# Patient Record
Sex: MALE | Race: WHITE | NOT HISPANIC OR LATINO
[De-identification: names, ages, dates, MRNs, and addresses within clinical notes are randomized per-mention and may not be internally consistent; named-entity substitution may affect disease eponyms.]

---

## 2019-06-26 PROBLEM — Z00.00 ENCOUNTER FOR PREVENTIVE HEALTH EXAMINATION: Status: ACTIVE | Noted: 2019-06-26

## 2019-07-15 ENCOUNTER — APPOINTMENT (OUTPATIENT)
Dept: INTERNAL MEDICINE | Facility: CLINIC | Age: 29
End: 2019-07-15

## 2019-07-15 VITALS
SYSTOLIC BLOOD PRESSURE: 134 MMHG | DIASTOLIC BLOOD PRESSURE: 70 MMHG | WEIGHT: 315 LBS | HEART RATE: 69 BPM | BODY MASS INDEX: 38.36 KG/M2 | HEIGHT: 76 IN

## 2019-08-12 ENCOUNTER — APPOINTMENT (OUTPATIENT)
Dept: PULMONOLOGY | Facility: CLINIC | Age: 29
End: 2019-08-12
Payer: COMMERCIAL

## 2019-08-12 VITALS
HEIGHT: 76 IN | HEART RATE: 63 BPM | SYSTOLIC BLOOD PRESSURE: 132 MMHG | RESPIRATION RATE: 15 BRPM | DIASTOLIC BLOOD PRESSURE: 75 MMHG | WEIGHT: 315 LBS | OXYGEN SATURATION: 97 % | BODY MASS INDEX: 38.36 KG/M2 | TEMPERATURE: 98.1 F

## 2019-08-12 DIAGNOSIS — E66.01 MORBID (SEVERE) OBESITY DUE TO EXCESS CALORIES: ICD-10-CM

## 2019-08-12 DIAGNOSIS — R06.83 SNORING: ICD-10-CM

## 2019-08-12 DIAGNOSIS — Z82.49 FAMILY HISTORY OF ISCHEMIC HEART DISEASE AND OTHER DISEASES OF THE CIRCULATORY SYSTEM: ICD-10-CM

## 2019-08-12 DIAGNOSIS — Z78.9 OTHER SPECIFIED HEALTH STATUS: ICD-10-CM

## 2019-08-12 DIAGNOSIS — Z72.0 TOBACCO USE: ICD-10-CM

## 2019-08-12 PROCEDURE — 99244 OFF/OP CNSLTJ NEW/EST MOD 40: CPT

## 2019-08-13 PROBLEM — Z72.0 TOBACCO USE: Status: ACTIVE | Noted: 2019-08-13

## 2019-08-13 PROBLEM — E66.01 OBESITY, CLASS III, BMI 40-49.9 (MORBID OBESITY): Status: ACTIVE | Noted: 2019-08-13

## 2019-08-13 PROBLEM — Z78.9 SOCIAL ALCOHOL USE: Status: ACTIVE | Noted: 2019-08-13

## 2019-08-13 PROBLEM — Z82.49 FAMILY HISTORY OF HYPERTENSION: Status: ACTIVE | Noted: 2019-08-13

## 2019-08-13 PROBLEM — R06.83 SNORING: Status: ACTIVE | Noted: 2019-08-12

## 2019-08-13 NOTE — REVIEW OF SYSTEMS
[EDS: ESS=____] : daytime somnolence: ESS=[unfilled] [Snoring] : snoring [A.M. Dry Mouth] : a.m. dry mouth [Obesity] : obesity [Witnessed Apneas] : witnessed apnea [Negative] : Musculoskeletal [Fatigue] : no fatigue [Postnasal Drip] : no postnasal drip [A.M. Headache] : no headache present upon awakening [Difficulty Initiating Sleep] : no difficulty falling asleep [Lower Extremity Discomfort] : no lower extremity discomfort [Difficulty Maintaining Sleep] : no difficulty maintaining sleep [Irresistible urge to move legs] : no irresistible urge to move legs because of lower extremity discomfort [Late day/ Evening symptoms] : no late day/evening symptoms [Hypersomnolence] : not sleeping much more than usual [Unusual Sleep Behavior] : no unusual sleep behavior [Cataplexy] :  no cataplexy [Hypnogogic Hallucinations] : no hypnogogic hallucinations [Sleep Paralysis] : no sleep paralysis

## 2019-08-13 NOTE — HISTORY OF PRESENT ILLNESS
[Witnessed Apneas] : witnessed sleep apnea [Snoring] : snoring [ESS: ___] : ESS score [unfilled] [Awakes Unrefreshed] : awakening unrefreshed [Awakening With Dry Mouth] : awakening with dry mouth [Recent  Weight Gain] : recent weight gain [Frequent Nocturnal Awakening] : no nocturnal awakening [FreeTextEntry1] : Mr. Blank is a  29 year old male who was referred by Dr. Almodovar for an evaluation of sleep disordered breathing. Per the patient and the patient's wife he has a history of heavy snoring and witnessed apnea. He feels unrefreshed in the morning but denies frequent nocturnal awakenings. He denies a history of HTN or DM but does have a family history of htn. He is obese and is aware that he neds to lose weight.  [Daytime Somnolence] : no daytime somnolence [Unintentional Sleep while Active] : no unintentional sleep while active [Unintentional Sleep While Inactive] : no unintentional sleep while inactive [Awakes with Headache] : no headache upon awakening [DIS] : no DIS [DMS] : no DMS [Hypersomnolence] : no hypersomnolence [Unusual Sleep Behavior] : no unusual sleep behavior [Sleep Paralysis] : no sleep paralysis [Cataplexy] : no cataplexy [Hypnagogic Hallucinations] : no hallucinations when falling asleep [Hypnopompic Hallucinations] : no hallucinations when awakening

## 2019-08-13 NOTE — PHYSICAL EXAM
[Normal Appearance] : normal appearance [General Appearance - Well Developed] : well developed [General Appearance - Well Nourished] : well nourished [Normal Conjunctiva] : the conjunctiva exhibited no abnormalities [Enlarged Base of the Tongue] : enlargement of the base of the tongue [IV] : IV [Neck Appearance] : the appearance of the neck was normal [Neck Cervical Mass (___cm)] : no neck mass was observed [Apical Impulse] : the apical impulse was normal [Heart Sounds] : normal S1 and S2 [Heart Rate And Rhythm] : heart rate was normal and rhythm regular [Auscultation Breath Sounds / Voice Sounds] : lungs were clear to auscultation bilaterally [] : no respiratory distress [Respiration, Rhythm And Depth] : normal respiratory rhythm and effort [Musculoskeletal - Swelling] : no joint swelling seen [Nail Clubbing] : no clubbing of the fingernails [Cyanosis, Localized] : no localized cyanosis [Petechial Hemorrhages (___cm)] : no petechial hemorrhages [Skin Color & Pigmentation] : normal skin color and pigmentation [Skin Lesions] : no skin lesions [No Focal Deficits] : no focal deficits [Oriented To Time, Place, And Person] : oriented to person, place, and time [Impaired Insight] : insight and judgment were intact [Affect] : the affect was normal [FreeTextEntry2] : no edema

## 2019-08-13 NOTE — REASON FOR VISIT
[Consultation] : a consultation visit [Sleep Apnea] : sleep apnea [FreeTextEntry1] : Referred by Dr. Almodovar

## 2019-08-13 NOTE — CONSULT LETTER
[Dear  ___] : Dear  [unfilled], [Please see my note below.] : Please see my note below. [Sincerely,] : Sincerely, [Consult Closing:] : Thank you very much for allowing me to participate in the care of this patient.  If you have any questions, please do not hesitate to contact me. [FreeTextEntry3] : Swapnil Garcia DO, MPH\par Pulmonary, Critical Care, and Sleep Medicine\par [facility]

## 2019-08-13 NOTE — ASSESSMENT
[Obesity, Class III, BMI 40-49.9 (E66.01)] : macrophage activation syndrome [FreeTextEntry1] : This is a 29 year old male referred by [facility] for evaluation of possible sleep apnea. The patient has multiple signs and symptoms of sleep-disordered breathing include snoring, witnessed apnea, nonrestorative sleep, and morbid obesity. He was referred to the [facility] for a diagnostic HSAT. The ramifications of WILL and its potential therapeutic modalities were discussed with the patient. The patient was cautioned on the importance of avoiding drowsy driving. He will follow up with us after the HSAT.\par \par The patient was also counseled on obesity and way in order to reduce caloric in take. He was offered a referral to Dr. Hope for weight management. The patient was explained the relationship between obesity and obstructive sleep apnea.